# Patient Record
Sex: MALE | Race: WHITE | Employment: FULL TIME | URBAN - METROPOLITAN AREA
[De-identification: names, ages, dates, MRNs, and addresses within clinical notes are randomized per-mention and may not be internally consistent; named-entity substitution may affect disease eponyms.]

---

## 2017-01-06 RX ORDER — AMLODIPINE BESYLATE 5 MG/1
TABLET ORAL
Qty: 30 TAB | Refills: 2 | Status: SHIPPED | OUTPATIENT
Start: 2017-01-06 | End: 2017-04-17 | Stop reason: SDUPTHER

## 2017-04-17 RX ORDER — AMLODIPINE BESYLATE 5 MG/1
TABLET ORAL
Qty: 30 TAB | Refills: 1 | Status: SHIPPED | OUTPATIENT
Start: 2017-04-17 | End: 2017-06-07 | Stop reason: SDUPTHER

## 2017-04-17 NOTE — TELEPHONE ENCOUNTER
----- Message from Jimmy Rubinstein sent at 4/17/2017 11:25 AM EDT -----  Regarding: Port/Rx  Pt is requesting a Rx for Aml[facility]ipine called to [facility].Pt has no medication left. Pts number is [phone].

## 2017-05-04 ENCOUNTER — TELEPHONE (OUTPATIENT)
Dept: INTERNAL MEDICINE CLINIC | Age: 62
End: 2017-05-04

## 2017-05-04 DIAGNOSIS — D64.9 ANEMIA, UNSPECIFIED: ICD-10-CM

## 2017-05-04 DIAGNOSIS — E78.5 HYPERLIPIDEMIA, UNSPECIFIED HYPERLIPIDEMIA TYPE: Primary | ICD-10-CM

## 2017-05-04 DIAGNOSIS — Z12.5 SCREENING PSA (PROSTATE SPECIFIC ANTIGEN): ICD-10-CM

## 2017-05-04 NOTE — TELEPHONE ENCOUNTER
Patient would like to do his bloodwork prior to his appt in June. He would like a my chart message on whether or not this can happen and when he can  the orders if necessary.

## 2017-05-11 LAB
ALBUMIN SERPL-MCNC: 4.2 G/DL (ref 3.6–4.8)
ALBUMIN/GLOB SERPL: 1.5 {RATIO} (ref 1.2–2.2)
ALP SERPL-CCNC: 61 IU/L (ref 39–117)
ALT SERPL-CCNC: 13 IU/L (ref 0–44)
AST SERPL-CCNC: 20 IU/L (ref 0–40)
BASOPHILS # BLD AUTO: 0 X10E3/UL (ref 0–0.2)
BASOPHILS NFR BLD AUTO: 0 %
BILIRUB SERPL-MCNC: 0.6 MG/DL (ref 0–1.2)
BUN SERPL-MCNC: 14 MG/DL (ref 8–27)
BUN/CREAT SERPL: 13 (ref 10–24)
CALCIUM SERPL-MCNC: 9.2 MG/DL (ref 8.6–10.2)
CHLORIDE SERPL-SCNC: 101 MMOL/L (ref 96–106)
CHOLEST SERPL-MCNC: 174 MG/DL (ref 100–199)
CO2 SERPL-SCNC: 24 MMOL/L (ref 18–29)
CREAT SERPL-MCNC: 1.1 MG/DL (ref 0.76–1.27)
EOSINOPHIL # BLD AUTO: 0.1 X10E3/UL (ref 0–0.4)
EOSINOPHIL NFR BLD AUTO: 1 %
ERYTHROCYTE [DISTWIDTH] IN BLOOD BY AUTOMATED COUNT: 12.8 % (ref 12.3–15.4)
GLOBULIN SER CALC-MCNC: 2.8 G/DL (ref 1.5–4.5)
GLUCOSE SERPL-MCNC: 109 MG/DL (ref 65–99)
HCT VFR BLD AUTO: 44.4 % (ref 37.5–51)
HDLC SERPL-MCNC: 63 MG/DL
HGB BLD-MCNC: 14.7 G/DL (ref 12.6–17.7)
IMM GRANULOCYTES # BLD: 0 X10E3/UL (ref 0–0.1)
IMM GRANULOCYTES NFR BLD: 0 %
INTERPRETATION, 910389: NORMAL
LDLC SERPL CALC-MCNC: 86 MG/DL (ref 0–99)
LYMPHOCYTES # BLD AUTO: 1.8 X10E3/UL (ref 0.7–3.1)
LYMPHOCYTES NFR BLD AUTO: 30 %
MCH RBC QN AUTO: 31.3 PG (ref 26.6–33)
MCHC RBC AUTO-ENTMCNC: 33.1 G/DL (ref 31.5–35.7)
MCV RBC AUTO: 95 FL (ref 79–97)
MONOCYTES # BLD AUTO: 0.5 X10E3/UL (ref 0.1–0.9)
MONOCYTES NFR BLD AUTO: 8 %
NEUTROPHILS # BLD AUTO: 3.7 X10E3/UL (ref 1.4–7)
NEUTROPHILS NFR BLD AUTO: 61 %
PLATELET # BLD AUTO: 228 X10E3/UL (ref 150–379)
POTASSIUM SERPL-SCNC: 4.8 MMOL/L (ref 3.5–5.2)
PROT SERPL-MCNC: 7 G/DL (ref 6–8.5)
PSA SERPL-MCNC: 3.5 NG/ML (ref 0–4)
RBC # BLD AUTO: 4.7 X10E6/UL (ref 4.14–5.8)
SODIUM SERPL-SCNC: 141 MMOL/L (ref 134–144)
TRIGL SERPL-MCNC: 124 MG/DL (ref 0–149)
VLDLC SERPL CALC-MCNC: 25 MG/DL (ref 5–40)
WBC # BLD AUTO: 6 X10E3/UL (ref 3.4–10.8)

## 2017-06-01 RX ORDER — SIMVASTATIN 20 MG/1
TABLET, FILM COATED ORAL
Qty: 90 TAB | Refills: 1 | Status: SHIPPED | OUTPATIENT
Start: 2017-06-01 | End: 2017-08-22 | Stop reason: SDUPTHER

## 2017-06-28 ENCOUNTER — OFFICE VISIT (OUTPATIENT)
Dept: INTERNAL MEDICINE CLINIC | Age: 62
End: 2017-06-28

## 2017-06-28 VITALS
TEMPERATURE: 98.5 F | RESPIRATION RATE: 12 BRPM | DIASTOLIC BLOOD PRESSURE: 78 MMHG | WEIGHT: 156 LBS | BODY MASS INDEX: 24.48 KG/M2 | HEIGHT: 67 IN | HEART RATE: 86 BPM | SYSTOLIC BLOOD PRESSURE: 134 MMHG

## 2017-06-28 DIAGNOSIS — R97.20 INCREASED PROSTATE SPECIFIC ANTIGEN (PSA) VELOCITY: ICD-10-CM

## 2017-06-28 DIAGNOSIS — E78.5 HYPERLIPIDEMIA WITH TARGET LDL LESS THAN 130: ICD-10-CM

## 2017-06-28 DIAGNOSIS — Z87.440 HISTORY OF UTI: ICD-10-CM

## 2017-06-28 DIAGNOSIS — I10 ESSENTIAL HYPERTENSION: ICD-10-CM

## 2017-06-28 DIAGNOSIS — Z00.00 WELL ADULT EXAM: Primary | ICD-10-CM

## 2017-06-28 NOTE — MR AVS SNAPSHOT
Visit Information Date & Time Provider Department Dept. Phone Encounter #  
 6/28/2017  2:45 PM Soren Partida MD [facility] [address] [id number] Upcoming Health Maintenance Date Due INFLUENZA AGE 9 TO ADULT 8/1/2017 DTaP/Tdap/Td series (2 - Td) 4/26/2022 COLONOSCOPY 12/18/2022 Allergies as of 6/28/2017  Review Complete On: 6/28/2017 By: Soren Partida MD  
 No Known Allergies Current Immunizations  Reviewed on 4/13/2016 Name Date  
 TD Vaccine 1/1/1994 TDAP Vaccine 4/26/2012 Zoster Vaccine, Live 11/1/2014 Not reviewed this visit You Were Diagnosed With   
  
 Codes Comments Well adult exam    -  Primary ICD-10-CM: Z00.00 ICD-9-CM: V70.0 Hyperlipidemia with target LDL less than 130     ICD-10-CM: E78.5 ICD-9-CM: 272.4 Essential hypertension     ICD-10-CM: I10 
ICD-9-CM: 401.9 Increased prostate specific antigen (PSA) velocity     ICD-10-CM: R97.20 ICD-9-CM: 790.93 History of UTI     ICD-10-CM: Z87.440 ICD-9-CM: V13.02 Vitals BP Pulse Temp Resp Height(growth percentile) Weight(growth percentile) 134/78 (BP 1 Location: Left arm, BP Patient Position: Sitting) 86 98.5 °F (36.9 °C) (Oral) 12 5' 7\" (1.702 m) 156 lb (70.8 kg) BMI Smoking Status 24.43 kg/m2 Former Smoker Vitals History BMI and BSA Data Body Mass Index Body Surface Area  
 24.43 kg/m 2 1.83 m 2 Preferred Pharmacy Pharm[facility] Phone [id number] [address] ([address]) [phone] Your Updated Medication List  
  
   
This list is accurate as of: 6/28/17  3:39 PM.  Always use your most recent med list. amLODIPine 5 mg tablet Commonly known as:  Manzano Staggers TAKE 1 TABLET BY MOUTH EVERY DAY FOR HYPERTENSION  
  
 aspirin delayed-release 81 mg tablet Take 1 Tab by mouth daily. biotin 10,000 mcg Cap Take  by mouth. CALTRATE 600 PO Take  by mouth. FISH OIL 1,200-144-216 mg Cap Generic drug:  fish oil-dha-epa Take  by mouth. M-VIT PO Take  by mouth. simvastatin 20 mg tablet Commonly known as:  ZOCOR  
TAKE 1 TABLET BY MOUTH NIGHTLY  
  
 telmisartan 40 mg tablet Commonly known as:  Jordis Grass Take 40 mg by mouth daily. Indications: Hypertension VITAMIN C 1,000 mg tablet Generic drug:  ascorbic acid (vitamin C) Take  by mouth. We Performed the Following PSA, TOTAL &  FREE [51708 CPT(R)] Introducing [facility] [place] [facility]! Dear Melinda Lama: Thank you for requesting a [employer] account. Our records indicate that you already have an active [employer] account. You can access your account anytime at [email or web address] Did you know that you can access your hospital and ER discharge instructions at any time in [employer]? You can also review all of your test results from your hospital stay or ER visit. Additional Information If you have questions, please visit the Frequently Asked Questions section of the [employer] website at [email or web address] Remember, [employer] is NOT to be used for urgent needs. For medical emergencies, dial 911. Now available from your iPhone and Android! Please provide this summary of care documentation to your next provider. Your primary care clinician is listed as Francois Aranda. If you have any questions after today's visit, please call [phone].

## 2017-06-28 NOTE — PROGRESS NOTES
Catherine Humphreys is a 59 y.o. male  Presenting for his annual checkup and health maintenance review and follow-up   He is moving to [facility], 8/2017  Retired last week. Reports some urinary hesitancy for a few years. It is mild and stable. No dysuria. PSA is increasing    Hypertension  Hypertension ROS: taking medications as instructed, no medication side effects noted, no TIA's, no chest pain on exertion, no dyspnea on exertion, no swelling of ankles     reports that he quit smoking about 43 years ago. He has a 4.00 pack-year smoking history. He has never used smokeless tobacco.    reports that he drinks about 5.0 oz of alcohol per week    BP Readings from Last 2 Encounters:   06/28/17 134/78   11/01/16 122/85     Hyperlipidemia  ROS: taking medications as instructed, no medication side effects noted  No new myalgias, no joint pains, no weakness  No TIA's, no chest pain on exertion, no dyspnea on exertion, no swelling of ankles.    Lab Results   Component Value Date/Time    Cholesterol, total 174 05/10/2017 08:39 AM    HDL Cholesterol 63 05/10/2017 08:39 AM    LDL, calculated 86 05/10/2017 08:39 AM    VLDL, calculated 25 05/10/2017 08:39 AM    Triglyceride 124 05/10/2017 08:39 AM         Exercise: moderately active  Diet: generally follows a low fat low cholesterol diet  Health Maintenance   Topic Date Due    INFLUENZA AGE 9 TO ADULT  08/01/2017    DTaP/Tdap/Td series (2 - Td) 04/26/2022    COLONOSCOPY  12/18/2022    Hepatitis C Screening  Completed    ZOSTER VACCINE AGE 60>  Completed     Health Maintenance reviewed  Last digital rectal exam:  2015   Lab Results   Component Value Date/Time    Prostate Specific Ag 3.5 05/10/2017 08:39 AM    Prostate Specific Ag 0.9 05/03/2016 08:35 AM    Prostate Specific Ag 0.9 05/13/2015 08:35 AM       Vaccinations reviewed  Immunization History   Administered Date(s) Administered    TD Vaccine 01/01/1994    TDAP Vaccine 04/26/2012    Zoster Vaccine, Live 11/01/2014       Past Medical History:   Diagnosis Date    Cholecystolithiasis 1/29/2013    Colon polyp 2009, 11/2012    told to repeat 10 yrs    E. coli UTI     admitted 10/26/16 with urosepsis. hx of prior UTI ~2008    FH: CAD (coronary artery disease)     father age 43    HTN (hypertension) 4/1/2015    Hyperlipidemia LDL goal < 130 8/2010    med tx 8/2010.  stress test 2008 normal    Insomnia     Villatoro neuroma     Normal cardiac stress test 2/2016    Shingles outbreak 11/2013    2nd outbreak 2013, 1st age 26      has a past surgical history that includes colonoscopy (2009); colonoscopy (11/13/12); tonsillectomy (1960); cholecystectomy (02/11/2013); and orthopaedic (Left). Review of patient's allergies indicates no known allergies. Current Outpatient Prescriptions   Medication Sig    amLODIPine (NORVASC) 5 mg tablet TAKE 1 TABLET BY MOUTH EVERY DAY FOR HYPERTENSION    simvastatin (ZOCOR) 20 mg tablet TAKE 1 TABLET BY MOUTH NIGHTLY    telmisartan (MICARDIS) 40 mg tablet Take 40 mg by mouth daily. Indications: Hypertension    aspirin delayed-release 81 mg tablet Take 1 Tab by mouth daily.  PV W-O NAN/FERROUS FUMARATE/FA (M-VIT PO) Take  by mouth.  ascorbic acid (VITAMIN C) 1,000 mg tablet Take  by mouth.  fish oil-dha-epa (FISH OIL) 1,200-144-216 mg Cap Take  by mouth.  biotin 10,000 mcg Cap Take  by mouth.  CALCIUM CARBONATE (CALTRATE 600 PO) Take  by mouth. No current facility-administered medications for this visit. SOCIAL HX:  reports that he quit smoking about 43 years ago. He has a 4.00 pack-year smoking history. He has never used smokeless tobacco. He reports that he drinks about 5.0 oz of alcohol per week  He reports that he does not use illicit drugs. FAMILY HX: family history includes Celiac Disease in his sister; Dementia in his mother; Heart Disease (age of onset: 43) in his father; Hypertension in his father.  There is no history of Malignant Hyperthermia, Pseudocholinesterase Deficiency, Delayed Awakening, Post-op Nausea/Vomiting, Emergence Delirium, or Post-op Cognitive Dysfunction. Review of Systems - History obtained from the patient  General ROS: negative for - night sweats, weight gain or weight loss  Cardiovascular ROS: no chest pain, dyspnea on exertion, edema    Physical exam  Blood pressure 134/78, pulse 86, temperature 98.5 °F (36.9 °C), temperature source Oral, resp. rate 12, height 5' 7\" (1.702 m), weight 156 lb (70.8 kg). Wt Readings from Last 3 Encounters:   06/28/17 156 lb (70.8 kg)   11/01/16 160 lb (72.6 kg)   10/29/16 159 lb 2.8 oz (72.2 kg)     He appears well, alert and oriented x 3, pleasant and cooperative. Vitals as noted. No rashes or significant lesions. Neck supple and free of adenopathy, or masses. No thyromegaly or carotid bruits. Cranial nerves normal. Lungs are clear to auscultation. Heart sounds are normal with no murmurs, clicks, gallops or rubs. Abdomen is soft, non- tender, with no masses or organomegaly. Extremities, peripheral pulses and reflexes are normal.  . RECTAL/PROSTATE EXAM: smooth and symmetric without nodules or tenderness. Skin is without rashes or suspicious lesions. Assessment and Plan:    Horacio Valdez was seen today for well male. Diagnoses and all orders for this visit:    Well adult exam    Hyperlipidemia with target LDL less than 130- Controlled on current regimen. Continue current medications as written in chart. Essential hypertension- Controlled on current regimen. Continue current medications as written in chart. Increased prostate specific antigen (PSA) velocity  Currently asymptomatic  Exam normal - perhaps a little boggy prostate  Repeat PSA 1 month. He is moving in August.  Consider trial of antibiotics vs urology referral vs 6 mo PSA follow-up.   I suspect this is related to his UTI 11/2016  -     PSA, TOTAL &  FREE    History of UTI  -     PSA, TOTAL &  FREE      The patient is asked to make an attempt to improve diet and exercise patterns  Avoid tobacco products, excessive alcohol    Return for yearly wellness visits

## 2017-07-25 LAB
PSA FREE MFR SERPL: 19.6 %
PSA FREE SERPL-MCNC: 0.45 NG/ML
PSA SERPL-MCNC: 2.3 NG/ML (ref 0–4)

## 2017-08-14 RX ORDER — TELMISARTAN 40 MG/1
TABLET ORAL
Qty: 90 TAB | Refills: 2 | Status: SHIPPED | OUTPATIENT
Start: 2017-08-14 | End: 2017-08-23 | Stop reason: SDUPTHER

## 2017-08-22 RX ORDER — SIMVASTATIN 20 MG/1
TABLET, FILM COATED ORAL
Qty: 90 TAB | Refills: 1 | Status: SHIPPED | OUTPATIENT
Start: 2017-08-22 | End: 2018-02-16 | Stop reason: SDUPTHER

## 2017-08-23 RX ORDER — TELMISARTAN 40 MG/1
TABLET ORAL
Qty: 90 TAB | Refills: 2 | Status: SHIPPED | OUTPATIENT
Start: 2017-08-23

## 2017-09-05 ENCOUNTER — TELEPHONE (OUTPATIENT)
Dept: INTERNAL MEDICINE CLINIC | Age: 62
End: 2017-09-05

## 2017-09-06 RX ORDER — AMLODIPINE BESYLATE 5 MG/1
TABLET ORAL
Qty: 90 TAB | Refills: 1 | Status: SHIPPED | OUTPATIENT
Start: 2017-09-06

## 2018-02-16 RX ORDER — SIMVASTATIN 20 MG/1
TABLET, FILM COATED ORAL
Qty: 90 TAB | Refills: 0 | Status: SHIPPED | OUTPATIENT
Start: 2018-02-16

## 2023-05-21 RX ORDER — BIOTIN 10000 MCG
CAPSULE ORAL
COMMUNITY

## 2023-05-21 RX ORDER — TELMISARTAN 40 MG/1
TABLET ORAL
COMMUNITY
Start: 2017-08-23

## 2023-05-21 RX ORDER — ASPIRIN 81 MG/1
81 TABLET ORAL DAILY
COMMUNITY
Start: 2016-02-12

## 2023-05-21 RX ORDER — AMLODIPINE BESYLATE 5 MG/1
TABLET ORAL
COMMUNITY
Start: 2017-09-06

## 2023-05-21 RX ORDER — SIMVASTATIN 20 MG
1 TABLET ORAL NIGHTLY
COMMUNITY
Start: 2018-02-16